# Patient Record
Sex: FEMALE | Employment: FULL TIME | URBAN - METROPOLITAN AREA
[De-identification: names, ages, dates, MRNs, and addresses within clinical notes are randomized per-mention and may not be internally consistent; named-entity substitution may affect disease eponyms.]

---

## 2021-10-04 ENCOUNTER — HOSPITAL ENCOUNTER (OUTPATIENT)
Dept: ULTRASOUND IMAGING | Facility: HOSPITAL | Age: 51
Discharge: HOME | End: 2021-10-04
Attending: INTERNAL MEDICINE

## 2021-10-04 DIAGNOSIS — E04.1 THYROID NODULE: ICD-10-CM

## 2021-10-04 PROCEDURE — 76536 US EXAM OF HEAD AND NECK: CPT | Performed by: INTERNAL MEDICINE

## 2024-09-25 NOTE — PROGRESS NOTES
# [id number] González Connolly  Office number and hours provided
Please contact pt. She is scheduled for an FNA at [facility] however recent thyroid ultrasound demonstrates no nodules therefore no need for FNA and she should cancel that. No further follow-up needed given no nodules.
Telephone Information:  Home Phone      [phone]  Mobile          [phone]    Patient informed of Dr. Luis Chavez result note. Patient verbalized understanding and agrees with plan.
no